# Patient Record
Sex: MALE | Race: WHITE | NOT HISPANIC OR LATINO | Employment: FULL TIME | URBAN - METROPOLITAN AREA
[De-identification: names, ages, dates, MRNs, and addresses within clinical notes are randomized per-mention and may not be internally consistent; named-entity substitution may affect disease eponyms.]

---

## 2024-05-15 NOTE — PROGRESS NOTES
CHIEF COMPLAINT:  Consult (Right shoulder)       HISTORY OF PRESENT ILLNESS  Mr. Villalba is a pleasant 53 year old year old male who presents to clinic today with right shoulder pain.  Arben explains that started to have shoulder pain for the last several months mostly after playing tennis.  He notes he is still able to play tennis and participate at a high level, but his pain does begin for the beginning of his serving and continues and worsens throughout the match.    He also notes pain when he is sleeping on his right shoulder and now has to modify and sleep on his left.  Points towards the anterior portion of his right shoulder and denies any contribution into the deltoid region or posteriorly about the shoulder.  Denies any neck pain, numbness, tingling.    Onset: gradual  Location: right shoulder  Quality:  aching and dull  Duration: Several months   Severity: 5/10 at worst  Timing:intermittent episodes worse with activity, push ups, overhead motion, tennis  Modifying factors:  resting and non-use makes it better, movement and use makes it worse  Associated signs & symptoms: pain, weakness  Previous similar pain: No  Treatments to date: Ice, heat, NSAIDs     Additional history: as documented    Review of Systems:  Have you recently had a a fever, chills, weight loss? No  Do you have any vision problems? No  Do you have any chest pain or edema? No  Do you have any shortness of breath or wheezing?  No  Do you have stomach problems? No  Do you have any numbness or focal weakness? No  Do you have diabetes? No  Do you have problems with bleeding or clotting? No  Do you have an rashes or other skin lesions? No    MEDICAL HISTORY  There is no problem list on file for this patient.      Current Outpatient Medications   Medication Sig Dispense Refill    ASPIRIN PO Take 325 mg by mouth daily      diazepam (VALIUM) 5 MG tablet Take 1 tablet (5 mg) by mouth every 8 hours as needed (anal muscle spasm) 20 tablet 1     IBUPROFEN PO Take 400 mg by mouth as needed      oxyCODONE-acetaminophen (PERCOCET) 5-325 MG per tablet Take 1-2 tablets by mouth every 4 hours as needed for pain (moderate to severe) 40 tablet 0       No Known Allergies    No family history on file.    Additional medical/Social/Surgical histories reviewed in [facility] and updated as appropriate.       PHYSICAL EXAM  Ht 1.829 m (6')   Wt 90.7 kg (200 lb)   BMI 27.12 kg/m      General  - normal appearance, in no obvious distress  Musculoskeletal - Right shoulder  - inspection: normal bone and joint alignment, mild swelling over AC joint, normal clavicle  - palpation: tender over AC joint  - ROM:  painful passive flexion past 90 deg, painful adduction, IR at AC  - strength: 5/5  strength, 5/5 in all shoulder planes  - special tests:  (+) crossarm  (-) Speed  (-) Neer  (-) Torres  (-) Rene  Neuro  - no sensory or motor deficit, grossly normal coordination, normal muscle tone      IMAGING : XR shoulder right 4 views. Final results and radiologist's interpretation, available in the [facility] health record. Images were reviewed with the patient/family members in the office today. My personal interpretation of the performed imaging is no significant joint space widening or large osteophyte formation.    Limited in office ultrasound performed to the right AC joint.  Evidence for irregularity as well as small osteophyte noted at the distal clavicle.  Small hyperechoic densities noted within AC joint space.  Mild joint effusion.  Sono palpation with confirmed tenderness overlying the AC joint anteriorly.     ASSESSMENT & PLAN  Mr. Villalba is a 53 year old year old male who presents to clinic today with subacute right shoulder pain worse with activities that involve overhead motion such as tennis and sleeping.    Diagnosis: AC joint arthropathy of right shoulder, chronic pain of right shoulder    Treatment options discussed to address ongoing AC joint pain.  I did provide him  with a home exercise program to include stretching as well as strengthening of the shoulder complex.  Additionally we discussed activity modifications.  May try oral ibuprofen or Voltaren gel to the anterior and superior portion of his right shoulder.  Icing after playing tennis and he can continue to play tennis as tolerated.  If pain continues to worsen we may consider formal physical therapy, also discussed possibility of US guided corticosteroid injection to the AC joint.    Arben will start the home exercise program, modify activities and try Voltaren gel first.  He can follow-up in 6 to 8 weeks or as needed.    It was a pleasure seeing Arben today.    40 minutes on date of the encounter doing chart review, history and examination, independent imaging review, documentation, and additional activities noted above.  Independent interpretation and performing ultrasound and room today.    Davis Sesay DO, CAQSM  Primary Care Sports Medicine

## 2024-05-30 ENCOUNTER — ANCILLARY PROCEDURE (OUTPATIENT)
Dept: GENERAL RADIOLOGY | Facility: CLINIC | Age: 54
End: 2024-05-30
Attending: FAMILY MEDICINE
Payer: COMMERCIAL

## 2024-05-30 ENCOUNTER — OFFICE VISIT (OUTPATIENT)
Dept: ORTHOPEDICS | Facility: CLINIC | Age: 54
End: 2024-05-30
Payer: COMMERCIAL

## 2024-05-30 VITALS — HEIGHT: 72 IN | WEIGHT: 200 LBS | BODY MASS INDEX: 27.09 KG/M2

## 2024-05-30 DIAGNOSIS — M25.511 RIGHT SHOULDER PAIN: ICD-10-CM

## 2024-05-30 DIAGNOSIS — M25.511 CHRONIC RIGHT SHOULDER PAIN: Primary | ICD-10-CM

## 2024-05-30 DIAGNOSIS — M25.511 PAIN IN RIGHT ACROMIOCLAVICULAR JOINT: ICD-10-CM

## 2024-05-30 DIAGNOSIS — G89.29 CHRONIC RIGHT SHOULDER PAIN: Primary | ICD-10-CM

## 2024-05-30 PROCEDURE — 99204 OFFICE O/P NEW MOD 45 MIN: CPT | Performed by: FAMILY MEDICINE

## 2024-05-30 PROCEDURE — 73030 X-RAY EXAM OF SHOULDER: CPT | Mod: TC | Performed by: RADIOLOGY

## 2024-05-30 NOTE — PATIENT INSTRUCTIONS
AC joint arthritis of right shoulder        Ice after tennis  Consider cortisone injection with Dr. Sesay 6 weeks or as desired  Follow up as needed 6 weeks

## 2024-05-30 NOTE — LETTER
5/30/2024         RE: Arben Villalba  [address]  [facility] [address]        Dear Colleague,    Thank you for referring your patient, Arben Villalba, to the [facility] [place]. Please see a copy of my visit note below.    CHIEF COMPLAINT:  Consult (Right shoulder)       HISTORY OF PRESENT ILLNESS  Mr. Villalba is a pleasant 53 year old year old male who presents to clinic today with right shoulder pain.  Arben explains that started to have shoulder pain for the last several months mostly after playing tennis.  He notes he is still able to play tennis and participate at a high level, but his pain does begin for the beginning of his serving and continues and worsens throughout the match.    He also notes pain when he is sleeping on his right shoulder and now has to modify and sleep on his left.  Points towards the anterior portion of his right shoulder and denies any contribution into the deltoid region or posteriorly about the shoulder.  Denies any neck pain, numbness, tingling.    Onset: gradual  Location: right shoulder  Quality:  aching and dull  Duration: Several months   Severity: 5/10 at worst  Timing:intermittent episodes worse with activity, push ups, overhead motion, tennis  Modifying factors:  resting and non-use makes it better, movement and use makes it worse  Associated signs & symptoms: pain, weakness  Previous similar pain: No  Treatments to date: Ice, heat, NSAIDs     Additional history: as documented    Review of Systems:  Have you recently had a a fever, chills, weight loss? No  Do you have any vision problems? No  Do you have any chest pain or edema? No  Do you have any shortness of breath or wheezing?  No  Do you have stomach problems? No  Do you have any numbness or focal weakness? No  Do you have diabetes? No  Do you have problems with bleeding or clotting? No  Do you have an rashes or other skin lesions? No    MEDICAL HISTORY  There is no problem list  on file for this patient.      Current Outpatient Medications   Medication Sig Dispense Refill     ASPIRIN PO Take 325 mg by mouth daily       diazepam (VALIUM) 5 MG tablet Take 1 tablet (5 mg) by mouth every 8 hours as needed (anal muscle spasm) 20 tablet 1     IBUPROFEN PO Take 400 mg by mouth as needed       oxyCODONE-acetaminophen (PERCOCET) 5-325 MG per tablet Take 1-2 tablets by mouth every 4 hours as needed for pain (moderate to severe) 40 tablet 0       No Known Allergies    No family history on file.    Additional medical/Social/Surgical histories reviewed in [facility] and updated as appropriate.       PHYSICAL EXAM  Ht 1.829 m (6')   Wt 90.7 kg (200 lb)   BMI 27.12 kg/m      General  - normal appearance, in no obvious distress  Musculoskeletal - Right shoulder  - inspection: normal bone and joint alignment, mild swelling over AC joint, normal clavicle  - palpation: tender over AC joint  - ROM:  painful passive flexion past 90 deg, painful adduction, IR at AC  - strength: 5/5  strength, 5/5 in all shoulder planes  - special tests:  (+) crossarm  (-) Speed  (-) Neer  (-) Torres  (-) Rene  Neuro  - no sensory or motor deficit, grossly normal coordination, normal muscle tone      IMAGING : XR shoulder right 4 views. Final results and radiologist's interpretation, available in the [facility] health record. Images were reviewed with the patient/family members in the office today. My personal interpretation of the performed imaging is no significant joint space widening or large osteophyte formation.    Limited in office ultrasound performed to the right AC joint.  Evidence for irregularity as well as small osteophyte noted at the distal clavicle.  Small hyperechoic densities noted within AC joint space.  Mild joint effusion.  Sono palpation with confirmed tenderness overlying the AC joint anteriorly.     ASSESSMENT & PLAN  Mr. Villalba is a 53 year old year old male who presents to clinic today with subacute right  shoulder pain worse with activities that involve overhead motion such as tennis and sleeping.    Diagnosis: AC joint arthropathy of right shoulder, chronic pain of right shoulder    Treatment options discussed to address ongoing AC joint pain.  I did provide him with a home exercise program to include stretching as well as strengthening of the shoulder complex.  Additionally we discussed activity modifications.  May try oral ibuprofen or Voltaren gel to the anterior and superior portion of his right shoulder.  Icing after playing tennis and he can continue to play tennis as tolerated.  If pain continues to worsen we may consider formal physical therapy, also discussed possibility of US guided corticosteroid injection to the AC joint.    Arben will start the home exercise program, modify activities and try Voltaren gel first.  He can follow-up in 6 to 8 weeks or as needed.    It was a pleasure seeing Arben today.    40 minutes on date of the encounter doing chart review, history and examination, independent imaging review, documentation, and additional activities noted above.  Independent interpretation and performing ultrasound and room today.    Davis Sesay DO, [facility]  Primary Care Sports Medicine       Again, thank you for allowing me to participate in the care of your patient.        Sincerely,        Davis Sesay DO

## 2024-07-07 ENCOUNTER — HEALTH MAINTENANCE LETTER (OUTPATIENT)
Age: 54
End: 2024-07-07

## 2025-07-13 ENCOUNTER — HEALTH MAINTENANCE LETTER (OUTPATIENT)
Age: 55
End: 2025-07-13